# Patient Record
Sex: FEMALE | URBAN - METROPOLITAN AREA
[De-identification: names, ages, dates, MRNs, and addresses within clinical notes are randomized per-mention and may not be internally consistent; named-entity substitution may affect disease eponyms.]

---

## 2022-02-24 ENCOUNTER — OFFICE VISIT (OUTPATIENT)
Dept: URBAN - METROPOLITAN AREA CLINIC 85 | Facility: CLINIC | Age: 82
End: 2022-02-24
Payer: COMMERCIAL

## 2022-02-24 PROCEDURE — 92002 INTRM OPH EXAM NEW PATIENT: CPT | Performed by: OPTOMETRIST

## 2022-02-24 RX ORDER — DUREZOL 0.5 MG/ML
0.05 % EMULSION OPHTHALMIC
Qty: 1 | Refills: 0 | Status: INACTIVE
Start: 2022-02-24 | End: 2022-02-24

## 2022-02-24 ASSESSMENT — VISUAL ACUITY
OD: 20/20
OS: 20/20

## 2022-02-24 ASSESSMENT — INTRAOCULAR PRESSURE
OD: 18
OS: 17

## 2022-02-24 NOTE — IMPRESSION/PLAN
Impression: Uveitis (anterior): H20.9. Plan: Discussed limited views due to patient's refusing dilation. Initiate Durezol QID OS  and RTC 1 week with dilation or ASAP if decreased VA, pain, or any worsening of condition. (If pt. unable to get Durezol, OK to substitute prednsiolone Q2H while awake OS).

## 2022-03-02 ENCOUNTER — OFFICE VISIT (OUTPATIENT)
Dept: URBAN - METROPOLITAN AREA CLINIC 85 | Facility: CLINIC | Age: 82
End: 2022-03-02
Payer: COMMERCIAL

## 2022-03-02 DIAGNOSIS — H35.371 PUCKERING OF MACULA, RIGHT EYE: Primary | ICD-10-CM

## 2022-03-02 DIAGNOSIS — H26.493 OTHER SECONDARY CATARACT, BILATERAL: ICD-10-CM

## 2022-03-02 DIAGNOSIS — H20.9 UVEITIS: ICD-10-CM

## 2022-03-02 PROCEDURE — 92014 COMPRE OPH EXAM EST PT 1/>: CPT | Performed by: OPTOMETRIST

## 2022-03-02 PROCEDURE — 92134 CPTRZ OPH DX IMG PST SGM RTA: CPT | Performed by: OPTOMETRIST

## 2022-03-02 ASSESSMENT — INTRAOCULAR PRESSURE
OS: 15
OD: 16

## 2022-03-02 NOTE — IMPRESSION/PLAN
Impression: Puckering of macula, right eye: H35.371. Plan: Discussed findings with patient. No treatment needed at this time. Monitor.

## 2022-03-02 NOTE — IMPRESSION/PLAN
Impression: Uveitis (anterior): H20.9. Plan: Discussed improved condition. Continue Prednisolone OS Q2H while awake. RTC in 1-2 weeks for follow up.

## 2022-03-08 ENCOUNTER — OFFICE VISIT (OUTPATIENT)
Dept: URBAN - METROPOLITAN AREA CLINIC 85 | Facility: CLINIC | Age: 82
End: 2022-03-08
Payer: COMMERCIAL

## 2022-03-08 PROCEDURE — 92012 INTRM OPH EXAM EST PATIENT: CPT | Performed by: OPTOMETRIST

## 2022-03-08 ASSESSMENT — INTRAOCULAR PRESSURE
OS: 17
OD: 17

## 2022-03-08 NOTE — IMPRESSION/PLAN
Impression: Uveitis (anterior): H20.9. Plan: Discussed  resolved uveitis. Continue Prednisolone OS QID. RTC in 2 weeks for follow up.

## 2022-03-22 ENCOUNTER — OFFICE VISIT (OUTPATIENT)
Dept: URBAN - METROPOLITAN AREA CLINIC 85 | Facility: CLINIC | Age: 82
End: 2022-03-22
Payer: COMMERCIAL

## 2022-03-22 PROCEDURE — 92012 INTRM OPH EXAM EST PATIENT: CPT | Performed by: OPTOMETRIST

## 2022-03-22 RX ORDER — PREDNISOLONE ACETATE 10 MG/ML
1 % SUSPENSION/ DROPS OPHTHALMIC
Qty: 1 | Refills: 0 | Status: ACTIVE
Start: 2022-03-22

## 2022-03-22 ASSESSMENT — INTRAOCULAR PRESSURE
OD: 15
OS: 16

## 2022-03-22 NOTE — IMPRESSION/PLAN
Impression: Uveitis (anterior): H20.9. Plan: Discussed  resolved uveitis. Discussed need for slow taper off Prednisolone. Continue Prednisolone OS BID for 10 days then QD OS for 10 days then D/C.  RTC in 1 month for follow up.